# Patient Record
Sex: MALE | Race: WHITE | NOT HISPANIC OR LATINO | Employment: FULL TIME | URBAN - METROPOLITAN AREA
[De-identification: names, ages, dates, MRNs, and addresses within clinical notes are randomized per-mention and may not be internally consistent; named-entity substitution may affect disease eponyms.]

---

## 2019-06-13 ENCOUNTER — OFFICE VISIT (OUTPATIENT)
Dept: ORTHOPEDIC SURGERY | Facility: CLINIC | Age: 49
End: 2019-06-13

## 2019-06-13 VITALS — TEMPERATURE: 97.8 F | HEIGHT: 72 IN | WEIGHT: 285 LBS | BODY MASS INDEX: 38.6 KG/M2

## 2019-06-13 DIAGNOSIS — M25.562 ACUTE PAIN OF LEFT KNEE: Primary | ICD-10-CM

## 2019-06-13 DIAGNOSIS — S83.242A OTHER TEAR OF MEDIAL MENISCUS OF LEFT KNEE AS CURRENT INJURY, INITIAL ENCOUNTER: ICD-10-CM

## 2019-06-13 PROCEDURE — 99203 OFFICE O/P NEW LOW 30 MIN: CPT | Performed by: ORTHOPAEDIC SURGERY

## 2019-06-13 PROCEDURE — 73562 X-RAY EXAM OF KNEE 3: CPT | Performed by: ORTHOPAEDIC SURGERY

## 2019-06-13 RX ORDER — LOSARTAN POTASSIUM 25 MG/1
TABLET ORAL
Refills: 3 | COMMUNITY
Start: 2019-03-25

## 2019-06-13 RX ORDER — DICLOFENAC SODIUM 75 MG/1
TABLET, DELAYED RELEASE ORAL
Refills: 2 | COMMUNITY
Start: 2019-06-01

## 2019-06-13 RX ORDER — LORATADINE 10 MG/1
CAPSULE, LIQUID FILLED ORAL
COMMUNITY

## 2019-06-13 NOTE — PROGRESS NOTES
New Left Knee      Patient: Arnold Amor        YOB: 1970    Medical Record Number: [id number]        Chief Complaints: left knee pain  Chief Complaint   Patient presents with   • Left Knee - Establish Care, Pain           History of Present Illness: This is a 49-year-old very active male presents complaining of left knee pain began about 3 months ago he was at a fishing tournament noticed stiffness in his knee then ultimately swelling he has locking and catching in it times where he cannot bend or straighten his knee his symptoms are moderate constant grinding burning clicking swelling worse with sitting walking somewhat better with rest he is a  of his company in [facility] he has seen surgeons there for the last surgeon he saw told him that he does not do anything but knee replacements his past medical history is unremarkable  His activities are extremely limited      Allergies: Allergies not on file    Medications:   Home Medications:  No current outpatient medications on file prior to visit.     No current facility-administered medications on file prior to visit.      Current Medications:  Scheduled Meds:  Continuous Infusions:  No current facility-administered medications for this visit.   PRN Meds:.    No past medical history on file.   No past surgical history on file.     Social History     Occupational History   • Not on file   Tobacco Use   • Smoking status: Not on file   Substance and Sexual Activity   • Alcohol use: Not on file   • Drug use: Not on file   • Sexual activity: Not on file    Social History     Social History Narrative   • Not on file      No family history on file.          Review of Systems: 14 point review of systems are remarkable for the pertinent positives listed in the chart by the patient the remainder negative    Review of Systems      Physical Exam: 49 y.o. male  General Appearance:    Alert, cooperative, in no acute distress                  "There were no vitals filed for this visit.   Patient is alert and read ×3 no acute distress appears her above-listed at height weight and age.  Affect is normal respiratory rate is normal unlabored. Heart rate regular rate rhythm, sclera, dentition and hearing are normal for the purpose of this exam.        Ortho Exam Physical exam of the left knee reveals no effusion no redness.  The patient does have tenderness about the medial joint line.  No tenderness about the lateral joint line.  A negative bounce home and a positive medial Kina.  There is some pain medially  with a lateral Kina.  Patient has a stable ligamentous exam.  Quads are reasonable and symmetric bilaterally.  Calf is soft and nontender.  There is no overlying skin changes no lymphedema lymphadenopathy.  Patient has good hip range of motion full symmetric and asymptomatic and a normal ankle exam.  Physical Exam: 49 y.o. male  General Appearance:    Alert, cooperative, in no acute distress                      Vitals:    06/13/19 1146   Temp: 97.8 °F (36.6 °C)   TempSrc: Temporal   Weight: 129 kg (285 lb)   Height: 182.9 cm (72\")        Head:    Normocephalic, without obvious abnormality, atraumatic   Eyes:            conjunctivae and sclerae normal, no pallor, corneas clear,    Ears:    Ears appear intact with no abnormalities noted   Throat:   No oral lesions, no thrush, oral mucosa moist   Neck:   No adenopathy, supple, trachea midline, no thyromegaly,    Back:     No kyphosis present, no scoliosis present, no skin lesions,      erythema or scars, no tenderness to percussion or                   palpation,   range of motion normal   Lungs:     Clear to auscultation,respirations regular, even and                  unlabored    Heart:    Regular rhythm and normal rate               Chest Wall:    No abnormalities observed       Rectal:     Deferred   Extremities:    Moves all extremities well, no edema,   no cyanosis, no redness   Pulses:   " Pulses palpable and equal bilaterally   Skin:   No bleeding, bruising or rash   Lymph nodes:   No palpable adenopathy   Neurologic:   Appears neurologic intact       Procedures             Radiology:   AP, Lateral and merchant views of the left knee  were ordered/reviewed to evauateknee pain.  He brought these in with him he does have some mild degenerative changes patellofemoral joint he also has an MRI that he brought with him which shows some chondral fibrillation along the lateral patella facet he has joint effusion with cartilaginous debris I do see some changes within the medial meniscus that are not called on report however I do think there is some definite change in signal I have reviewed all the films and agree with the findings  Imaging Results (most recent)     None        Assessment/Plan:    Severe left knee pain in the patient with true mechanical symptoms in my mind.  He has had an injection with no relief in his symptoms his symptoms are mechanical the next step would be arthroscopy.  He does live in [place] I would request that we do this perhaps a late morning case he could drive in that morning and then he is willing to spend the night in town that night to ensure he does not have any problems.  We did discuss in detail risk benefits and alternatives The patient voiced understanding of the risks, benefits, and alternative forms of treatment that were discussed and the patient consents to proceed with the above listed surgery.  All risks, benefits and alternatives were discussed.  Risks including to but not exclusive to anesthetic complications, including death, MI, CVA, infection, bleeding DVT, fracture, residual pain and need for future surgery.  He understands these and agrees to proceed

## 2019-07-05 ENCOUNTER — HOSPITAL ENCOUNTER (OUTPATIENT)
Dept: CARDIOLOGY | Facility: HOSPITAL | Age: 49
Discharge: HOME | End: 2019-07-05
Admitting: ORTHOPAEDIC SURGERY

## 2019-07-05 ENCOUNTER — HOSPITAL ENCOUNTER (OUTPATIENT)
Dept: GENERAL RADIOLOGY | Facility: HOSPITAL | Age: 49
Discharge: HOME | End: 2019-07-05

## 2019-07-05 ENCOUNTER — TRANSCRIBE ORDERS (OUTPATIENT)
Dept: ADMINISTRATIVE | Facility: HOSPITAL | Age: 49
End: 2019-07-05

## 2019-07-05 ENCOUNTER — LAB (OUTPATIENT)
Dept: LAB | Facility: HOSPITAL | Age: 49
End: 2019-07-05

## 2019-07-05 DIAGNOSIS — M25.562 LEFT KNEE PAIN, UNSPECIFIED CHRONICITY: ICD-10-CM

## 2019-07-05 DIAGNOSIS — Z01.818 PRE-OP TESTING: ICD-10-CM

## 2019-07-05 DIAGNOSIS — M25.562 LEFT KNEE PAIN, UNSPECIFIED CHRONICITY: Primary | ICD-10-CM

## 2019-07-05 DIAGNOSIS — Z01.811 PRE-OP CHEST EXAM: ICD-10-CM

## 2019-07-05 LAB
ANION GAP SERPL CALCULATED.3IONS-SCNC: 13.2 MMOL/L (ref 5–15)
BASOPHILS # BLD AUTO: 0.06 10*3/MM3 (ref 0–0.2)
BASOPHILS NFR BLD AUTO: 0.7 % (ref 0–1.5)
BUN BLD-MCNC: 18 MG/DL (ref 6–20)
BUN/CREAT SERPL: 22.8 (ref 7–25)
CALCIUM SPEC-SCNC: 9.8 MG/DL (ref 8.6–10.5)
CHLORIDE SERPL-SCNC: 102 MMOL/L (ref 98–107)
CO2 SERPL-SCNC: 24.8 MMOL/L (ref 22–29)
CREAT BLD-MCNC: 0.79 MG/DL (ref 0.76–1.27)
DEPRECATED RDW RBC AUTO: 39.8 FL (ref 37–54)
EOSINOPHIL # BLD AUTO: 0.21 10*3/MM3 (ref 0–0.4)
EOSINOPHIL NFR BLD AUTO: 2.4 % (ref 0.3–6.2)
ERYTHROCYTE [DISTWIDTH] IN BLOOD BY AUTOMATED COUNT: 12.3 % (ref 12.3–15.4)
GFR SERPL CREATININE-BSD FRML MDRD: 105 ML/MIN/1.73
GLUCOSE BLD-MCNC: 99 MG/DL (ref 65–99)
HCT VFR BLD AUTO: 45.6 % (ref 37.5–51)
HGB BLD-MCNC: 15.5 G/DL (ref 13–17.7)
IMM GRANULOCYTES # BLD AUTO: 0.06 10*3/MM3 (ref 0–0.05)
IMM GRANULOCYTES NFR BLD AUTO: 0.7 % (ref 0–0.5)
LYMPHOCYTES # BLD AUTO: 2.5 10*3/MM3 (ref 0.7–3.1)
LYMPHOCYTES NFR BLD AUTO: 29 % (ref 19.6–45.3)
MCH RBC QN AUTO: 30.1 PG (ref 26.6–33)
MCHC RBC AUTO-ENTMCNC: 34 G/DL (ref 31.5–35.7)
MCV RBC AUTO: 88.5 FL (ref 79–97)
MONOCYTES # BLD AUTO: 0.63 10*3/MM3 (ref 0.1–0.9)
MONOCYTES NFR BLD AUTO: 7.3 % (ref 5–12)
NEUTROPHILS # BLD AUTO: 5.17 10*3/MM3 (ref 1.7–7)
NEUTROPHILS NFR BLD AUTO: 59.9 % (ref 42.7–76)
NRBC BLD AUTO-RTO: 0 /100 WBC (ref 0–0.2)
PLATELET # BLD AUTO: 224 10*3/MM3 (ref 140–450)
PMV BLD AUTO: 10.5 FL (ref 6–12)
POTASSIUM BLD-SCNC: 4 MMOL/L (ref 3.5–5.2)
RBC # BLD AUTO: 5.15 10*6/MM3 (ref 4.14–5.8)
SODIUM BLD-SCNC: 140 MMOL/L (ref 136–145)
WBC NRBC COR # BLD: 8.63 10*3/MM3 (ref 3.4–10.8)

## 2019-07-05 PROCEDURE — 85025 COMPLETE CBC W/AUTO DIFF WBC: CPT

## 2019-07-05 PROCEDURE — 36415 COLL VENOUS BLD VENIPUNCTURE: CPT

## 2019-07-05 PROCEDURE — 80048 BASIC METABOLIC PNL TOTAL CA: CPT

## 2019-07-05 PROCEDURE — 71046 X-RAY EXAM CHEST 2 VIEWS: CPT

## 2019-07-05 PROCEDURE — 93010 ELECTROCARDIOGRAM REPORT: CPT | Performed by: INTERNAL MEDICINE

## 2019-07-05 PROCEDURE — 93005 ELECTROCARDIOGRAM TRACING: CPT | Performed by: ORTHOPAEDIC SURGERY

## 2019-07-09 ENCOUNTER — TELEPHONE (OUTPATIENT)
Dept: ORTHOPEDIC SURGERY | Facility: CLINIC | Age: 49
End: 2019-07-09

## 2019-07-09 NOTE — TELEPHONE ENCOUNTER
In review of his preadmission testing patient was found to have an abnormal EKG with no prior EKG for comparison.  He also has an abnormal chest x-ray that shows a tortuous appearing aorta with some fullness in the right mediastinal contour that could represent a vascular prominence or presence of lymph nodes.   is recommending CT scan for further evaluation.  I have checked with his primary care physician's office and patient has not been seen since August 2018.  Made him an appointment to see the nurse practitioner on Thursday, July 11 at 7:30 AM.  All this information has been discussed with the patient

## 2019-07-10 NOTE — TELEPHONE ENCOUNTER
All returned to the patient's wife.  We discussed both his EKG and his chest x-ray.  Patient is a little anxious about the abnormal results however he is also anxious about having surgery altogether.  I explained to her that the EKG does not look like anything worrisome however we did not have a previous EKG for comparison and would prefer that his PCP look him over to make sure that he is okay to proceed with surgery.  In regards to the chest x-ray, there is an abnormality however again we would prefer that the PCP evaluate this further to see if the patient needs further testing.  Have left it open for her to call if she has any other questions or concerns

## 2019-07-26 ENCOUNTER — OUTSIDE FACILITY SERVICE (OUTPATIENT)
Dept: ORTHOPEDIC SURGERY | Facility: CLINIC | Age: 49
End: 2019-07-26

## 2019-07-26 ENCOUNTER — DOCUMENTATION (OUTPATIENT)
Dept: ORTHOPEDIC SURGERY | Facility: CLINIC | Age: 49
End: 2019-07-26

## 2019-07-26 PROCEDURE — 29881 ARTHRS KNE SRG MNISECTMY M/L: CPT | Performed by: ORTHOPAEDIC SURGERY

## 2019-07-26 NOTE — PROGRESS NOTES
History & Physical       Patient: Arnold Amor    Date of Admission: No admission date for patient encounter.    YOB: 1970    Medical Record Number: [id number]    Attending Physician: No att. providers found        Chief Complaints: Left knee pain    History of Present Illness: Patient has several month history of left knee pain he has some known degenerative changes but has true mechanical symptoms.  On his MRI he has some changes in his medial meniscus I suspect meniscal pathology.     Allergies: No Known Allergies    Medications:   Home Medications:  Current Outpatient Medications on File Prior to Visit   Medication Sig   • diclofenac (VOLTAREN) 75 MG EC tablet    • Loratadine 10 MG capsule Take  by mouth.   • losartan (COZAAR) 25 MG tablet      No current facility-administered medications on file prior to visit.      Current Medications:  Scheduled Meds:  Continuous Infusions:  No current facility-administered medications for this visit.   PRN Meds:.    No past medical history on file.     Past Surgical History:   Procedure Laterality Date   • APPENDECTOMY     • TONSILLECTOMY          Social History     Occupational History   • Not on file   Tobacco Use   • Smoking status: Never Smoker   • Smokeless tobacco: Never Used   Substance and Sexual Activity   • Alcohol use: Yes   • Drug use: Defer   • Sexual activity: Defer    Social History     Social History Narrative   • Not on file      No family history on file.    Review of Systems      Physical Exam: 49 y.o. male  General Appearance:    Alert, cooperative, in no acute distress                    There were no vitals filed for this visit.     Head:    Normocephalic, without obvious abnormality, atraumatic   Eyes:            conjunctivae and sclerae normal, no pallor, corneas clear,    Ears:    Ears appear intact with no abnormalities noted   Throat:   No oral lesions, no thrush, oral mucosa moist   Neck:   No adenopathy, supple, trachea  midline, no thyromegaly,    Back:     No kyphosis present, no scoliosis present, no skin lesions,      erythema or scars, no tenderness to percussion or                   palpation,   range of motion normal   Lungs:     Clear to auscultation,respirations regular, even and                  unlabored    Heart:    Regular rhythm and normal rate               Chest Wall:    No abnormalities observed   Abdomen:     Normal bowel sounds, no masses, no organomegaly, soft        non-tender, non-distended, no guarding, no rebound                tenderness   Rectal:     Deferred   Extremities:    Moves all extremities well, no edema,   no cyanosis, no redness   Pulses:   Pulses palpable and equal bilaterally   Skin:   No bleeding, bruising or rash   Lymph nodes:   No palpable adenopathy   Neurologic:   Appears neurologic intact             Assessment:  There is no problem list on file for this patient.    Degenerative changes possible medial meniscus tear possible loose body      Plan: All risks, benefits and alternatives were discussed.  Risks including to but not exclusive to anesthetic complications, including death, MI, CVA, infection, bleeding DVT, PE,  fracture, residual pain and need for future surgery.  Patient understood all and agrees to proceed.  He understands cannot do a lot about the arthritis or hopes with this or to decrease his mechanical symptoms

## 2019-07-28 ENCOUNTER — DOCUMENTATION (OUTPATIENT)
Dept: ORTHOPEDIC SURGERY | Facility: CLINIC | Age: 49
End: 2019-07-28

## 2019-07-28 NOTE — PROGRESS NOTES
Operative Note      Facility: [facility]  Patient Name: Arnold Amor  YOB: [birth year]  Date:Medical Record Number: [id number]      Pre-op Diagnosis:   Left knee medial meniscus tear and OA    Post-Op Diagnosis Codes:  Left knee medial meniscus tear grade 3 changes medial femoral condyle grade 3 changes patella    Procedure performed left knee arthroscopy partial medial meniscectomy and chondroplasty of the medial femoral condyle cartilage injury which is considered grade 3/4          Anesthesia: LMA  Staff:       Assistants : none      Estimated Blood Loss: 5 cc    Specimens:    none     Drains: None    Findings: See Dictation    Complications: None      Indication for procedure: This patient has had a several month history of knee pain and has an exam and an MRI which are consistent with meniscal pathology. They understand all options and wished to proceed with arthroscopy.      Description of procedure: The patient was taken to the operating room. They were placed supine on the operating room table. After induction of adequate LMA anesthesia, IV antibiotics the underwent exam under anesthesia was symmetric full range of motion. Nonsterile tourniquet was applied patient was placed in the thigh peres all prominent areas well padded and into the table dropped. The leg was prepped and draped in usual sterile fashion. Standard lateral incision was made with 11 blade. Blunt trocar penetrated into the joint scope followed in the evaluation began patella appeared to sit centrally within the trochlear groove he did have synovitis throughout the knee and effusion and injury to the patella cartilage is a chondral injury involved the majority of the patella is felt to be grade 3 then entered the medial compartment under spinal needle localization direct visualization a medial portal was established.  He did have a complex tear of the medial meniscus that was resected with various angled  biters baskets motorized yessenia after establishing a medial portal with spinal needle localization direct visualization.  I was also found to have a large flap all along the medial femoral condyle that was loose and was debrided.  This chondral injury comprised most of the medial femoral condyle was felt to be grade 3.  The notch was evaluated the ACL PCL were intact.  Then entered the lateral compartment this was normal.  I returned my attention back patellofemoral joint and he underwent chondroplasty of the patella.             At this point everything was thoroughly irrigated it was suctioned all 3 compartments, the gutters the suprapatellar pouch were all evaluated there was no further acute pathology seen.  Everything was thoroughly irrigated it was injected with Marcaine Depo-Medrol.  The portals were closed with 3-0 nylon interrupted fashion.  Sterile dressings and Ace wraps were applied.  The patient tolerated the procedure well and was taken to recovery room in good condition.  All sponge and needle count were correct            Odette Mosquera MD          Date: 7/28/2019  Time: 10:16 AM

## 2019-08-08 ENCOUNTER — OFFICE VISIT (OUTPATIENT)
Dept: ORTHOPEDIC SURGERY | Facility: CLINIC | Age: 49
End: 2019-08-08

## 2019-08-08 VITALS — WEIGHT: 297.2 LBS | HEIGHT: 72 IN | BODY MASS INDEX: 40.26 KG/M2 | TEMPERATURE: 97.5 F

## 2019-08-08 DIAGNOSIS — Z98.890 S/P ARTHROSCOPY OF KNEE: Primary | ICD-10-CM

## 2019-08-08 PROCEDURE — 99024 POSTOP FOLLOW-UP VISIT: CPT | Performed by: ORTHOPAEDIC SURGERY

## 2019-08-08 NOTE — PROGRESS NOTES
Left Knee Scope follow Up 1st Visit      Patient: Arnold Amor        YOB: 1970      Chief Complaints: knee pain  Chief Complaint   Patient presents with   • Left Knee - Post-op, Follow-up, Pain           History of Present Illness: Pt is here f/u knee arthroscopy he states he is doing great his knee feels also very happy with where he is        Allergies: No Known Allergies    Medications:   Home Medications:  Current Outpatient Medications on File Prior to Visit   Medication Sig   • diclofenac (VOLTAREN) 75 MG EC tablet    • Loratadine 10 MG capsule Take  by mouth.   • losartan (COZAAR) 25 MG tablet      No current facility-administered medications on file prior to visit.      Current Medications:  Scheduled Meds:  Continuous Infusions:  No current facility-administered medications for this visit.   PRN Meds:.          Physical Exam: 49 y.o. male  General Appearance:    Alert, cooperative, in no acute distress                 There were no vitals filed for this visit.   Patient is alert and oriented ×3 no acute distress normal mood physical exam.  Physical exam of the knee, incisions looked good there is no erythema, calf is soft and non-tender.  No sign or sx of DVT      Assessment  S/P knee scope.  I did review intraoperative findings and arthroscopic pictures with the patient.          Plan: To remove sutures today place Steri-Strips and start into  physical therapy and I will have thrm follow up in 4 weeks.  He is doing fine he may call and cancel

## 2019-09-26 ENCOUNTER — CLINICAL SUPPORT (OUTPATIENT)
Dept: ORTHOPEDIC SURGERY | Facility: CLINIC | Age: 49
End: 2019-09-26

## 2019-09-26 VITALS — HEIGHT: 74 IN | WEIGHT: 291 LBS | TEMPERATURE: 98 F | BODY MASS INDEX: 37.35 KG/M2

## 2019-09-26 DIAGNOSIS — M19.90 ARTHRITIS: ICD-10-CM

## 2019-09-26 DIAGNOSIS — Z98.890 STATUS POST ARTHROSCOPY OF KNEE: Primary | ICD-10-CM

## 2019-09-26 PROCEDURE — 99024 POSTOP FOLLOW-UP VISIT: CPT | Performed by: ORTHOPAEDIC SURGERY

## 2019-09-26 RX ORDER — METHYLPREDNISOLONE ACETATE 80 MG/ML
80 INJECTION, SUSPENSION INTRA-ARTICULAR; INTRALESIONAL; INTRAMUSCULAR; SOFT TISSUE
Status: COMPLETED | OUTPATIENT
Start: 2019-09-26 | End: 2019-09-26

## 2019-09-26 RX ADMIN — METHYLPREDNISOLONE ACETATE 80 MG: 80 INJECTION, SUSPENSION INTRA-ARTICULAR; INTRALESIONAL; INTRAMUSCULAR; SOFT TISSUE at 12:43

## 2019-09-26 NOTE — PROGRESS NOTES
Patient: Arnold Amor  YOB: 1970  Date of Service: 9/26/2019    Chief Complaints:   Chief Complaint   Patient presents with   • Left Knee - Follow-up, Pain   Left knee pain status post knee arthroscopy    Subjective:    History of Present Illness: Pt is seen in the office today with complaints of Chief Complaint   Patient presents with   • Left Knee - Follow-up, Pain   .    Patient is here with complaints of left knee pain he states he still much much better than he was before surgery but still has a little bit of achiness occasional swelling presumably due to his arthritis overall still very happy with his outcome      Allergies:   Allergies   Allergen Reactions   • Beef-Derived Products Anaphylaxis   • Pork-Derived Products Anaphylaxis       Medications:   Home Medications:  Current Outpatient Medications on File Prior to Visit   Medication Sig   • diclofenac (VOLTAREN) 75 MG EC tablet    • Loratadine 10 MG capsule Take  by mouth.   • losartan (COZAAR) 25 MG tablet      No current facility-administered medications on file prior to visit.      Current Medications:  Scheduled Meds:  Continuous Infusions:  No current facility-administered medications for this visit.   PRN Meds:.    I have reviewed the patient's medical history in detail and updated the computerized patient record.  Review and summarization of old records include:    No past medical history on file.     Past Surgical History:   Procedure Laterality Date   • APPENDECTOMY     • KNEE SURGERY Left     arthroscopy   • TONSILLECTOMY          Social History     Occupational History   • Not on file   Tobacco Use   • Smoking status: Never Smoker   • Smokeless tobacco: Never Used   Substance and Sexual Activity   • Alcohol use: Yes   • Drug use: Defer   • Sexual activity: Defer    Social History     Social History Narrative   • Not on file      No family history on file.    ROS: 14 point review of systems was performed and was negative except  for documented findings in HPI and today's encounter.     Allergies:   Allergies   Allergen Reactions   • Beef-Derived Products Anaphylaxis   • Pork-Derived Products Anaphylaxis     Constitutional:  Denies fever, shaking or chills   Eyes:  Denies change in visual acuity   HENT:  Denies nasal congestion or sore throat   Respiratory:  Denies cough or shortness of breath   Cardiovascular:  Denies chest pain or severe LE edema   GI:  Denies abdominal pain, nausea, vomiting, bloody stools or diarrhea   Musculoskeletal:  Numbness, tingling, or loss of motor function only as noted above in history of present illness.  : Denies painful urination or hematuria  Integument:  Denies rash, lesion or ulceration   Neurologic:  Denies headache or focal weakness  Endocrine:  Denies lymphadenopathy  Psych:  Denies confusion or change in mental status   Hem:  Denies active bleeding      Physical Exam: 49 y.o. male  Wt Readings from Last 3 Encounters:   08/08/19 135 kg (297 lb 3.2 oz)   06/13/19 129 kg (285 lb)     Is exam his knee healed surgical inci checks and  There is no height or weight on file to calculate BMI.  No height and weight on file for this encounter.  There were no vitals filed for this visit.  Vital signs reviewed.   General Appearance:    Alert, cooperative, in no acute distress                  Eyes: conjunctiva clear  ENT: external ears and nose atraumatic  CV: no peripheral edema  Resp: normal respiratory effort  Skin: no rashes or wounds; normal turgor  Psych: mood and affect appropriate  Lymph: no nodes appreciated  Neuro: gross sensation intact  Vascular:  Palpable peripheral pulse in noted extremity    Ortho exam  His exam his knee healed surgical incision he has no effusion full range of motion calf soft nontender some mild tenderness medially             Assessment: Status post knee arthroscopy we did review his intraoperative pictures he did have a quite a bit of arthritis but also macerated meniscus it  was removed    Plan: Status post knee arthroscopy still much better than he was before surgery still have some achiness I think this is due to his degenerative changes I think he benefit from an injection today follow up as indicated.  Ice, elevate, and rest as needed.  Discussed conservative measures of pain control including ice, bracing.  Also talked about the importance of strengthening and maintaining ideal body weight    Odette Mosquera M.D.      Large Joint Arthrocentesis: L knee  Date/Time: 9/26/2019 12:43 PM  Consent given by: patient  Site marked: site marked  Timeout: Immediately prior to procedure a time out was called to verify the correct patient, procedure, equipment, support staff and site/side marked as required   Supporting Documentation  Indications: pain   Procedure Details  Location: knee - L knee  Preparation: Patient was prepped and draped in the usual sterile fashion  Needle size: 22 G  Approach: anteromedial  Medications administered: 4 mL lidocaine (cardiac); 80 mg methylPREDNISolone acetate 80 MG/ML  Patient tolerance: patient tolerated the procedure well with no immediate complications

## 2021-06-14 ENCOUNTER — HOSPITAL ENCOUNTER (EMERGENCY)
Dept: HOSPITAL 49 - FER | Age: 51
Discharge: HOME | End: 2021-06-14
Payer: COMMERCIAL

## 2021-06-14 DIAGNOSIS — Z91.018: ICD-10-CM

## 2021-06-14 DIAGNOSIS — I10: ICD-10-CM

## 2021-06-14 DIAGNOSIS — Z79.899: ICD-10-CM

## 2021-06-14 DIAGNOSIS — L50.0: Primary | ICD-10-CM
